# Patient Record
Sex: MALE | Race: WHITE | NOT HISPANIC OR LATINO | Employment: FULL TIME | URBAN - METROPOLITAN AREA
[De-identification: names, ages, dates, MRNs, and addresses within clinical notes are randomized per-mention and may not be internally consistent; named-entity substitution may affect disease eponyms.]

---

## 2022-05-28 ENCOUNTER — NON-PROVIDER VISIT (OUTPATIENT)
Dept: URGENT CARE | Facility: PHYSICIAN GROUP | Age: 22
End: 2022-05-28

## 2022-05-28 DIAGNOSIS — Z02.1 PRE-EMPLOYMENT DRUG SCREENING: ICD-10-CM

## 2022-05-28 LAB
AMP AMPHETAMINE: NEGATIVE
COC COCAINE: NEGATIVE
INT CON NEG: NORMAL
INT CON POS: NORMAL
MET METHAMPHETAMINES: NEGATIVE
OPI OPIATES: NEGATIVE
PCP PHENCYCLIDINE: NEGATIVE
POC DRUG COMMENT 753798-OCCUPATIONAL HEALTH: NEGATIVE
THC: NEGATIVE

## 2022-05-28 PROCEDURE — 80305 DRUG TEST PRSMV DIR OPT OBS: CPT | Performed by: FAMILY MEDICINE

## 2024-10-03 ENCOUNTER — APPOINTMENT (OUTPATIENT)
Dept: URGENT CARE | Facility: CLINIC | Age: 24
End: 2024-10-03

## 2025-02-21 ENCOUNTER — OFFICE VISIT (OUTPATIENT)
Dept: URGENT CARE | Facility: CLINIC | Age: 25
End: 2025-02-21
Payer: COMMERCIAL

## 2025-02-21 VITALS
TEMPERATURE: 98 F | OXYGEN SATURATION: 98 % | HEART RATE: 88 BPM | DIASTOLIC BLOOD PRESSURE: 62 MMHG | BODY MASS INDEX: 25.3 KG/M2 | HEIGHT: 73 IN | WEIGHT: 190.9 LBS | SYSTOLIC BLOOD PRESSURE: 122 MMHG | RESPIRATION RATE: 16 BRPM

## 2025-02-21 DIAGNOSIS — R11.2 NAUSEA VOMITING AND DIARRHEA: ICD-10-CM

## 2025-02-21 DIAGNOSIS — R63.4 UNINTENTIONAL WEIGHT LOSS: ICD-10-CM

## 2025-02-21 DIAGNOSIS — R19.7 NAUSEA VOMITING AND DIARRHEA: ICD-10-CM

## 2025-02-21 PROCEDURE — 3078F DIAST BP <80 MM HG: CPT | Performed by: STUDENT IN AN ORGANIZED HEALTH CARE EDUCATION/TRAINING PROGRAM

## 2025-02-21 PROCEDURE — 3074F SYST BP LT 130 MM HG: CPT | Performed by: STUDENT IN AN ORGANIZED HEALTH CARE EDUCATION/TRAINING PROGRAM

## 2025-02-21 PROCEDURE — 99214 OFFICE O/P EST MOD 30 MIN: CPT | Performed by: STUDENT IN AN ORGANIZED HEALTH CARE EDUCATION/TRAINING PROGRAM

## 2025-02-21 RX ORDER — ONDANSETRON 4 MG/1
4 TABLET, ORALLY DISINTEGRATING ORAL EVERY 6 HOURS PRN
Qty: 15 TABLET | Refills: 0 | Status: SHIPPED | OUTPATIENT
Start: 2025-02-21

## 2025-02-21 ASSESSMENT — ENCOUNTER SYMPTOMS
ABDOMINAL PAIN: 0
CHILLS: 0
NAUSEA: 1
DIARRHEA: 1
BLOOD IN STOOL: 0
FEVER: 0
VOMITING: 1
CONSTIPATION: 0

## 2025-02-21 NOTE — PROGRESS NOTES
"Subjective     Juan Carlos Espino is a 25 y.o. male who presents with Diarrhea (X2days vomiting/abdominal discomfort/cramping/pt needs  Note/weight loss 20lb in the last 4 months )            Juan Carlos is a 25 y.o. male who presents urgent care with nausea, vomiting, diarrhea.  Symptoms started 2 days ago.  Patient missed work today due to symptoms and needs a work note.  Reports intermittent abdominal cramping.  No known risk factors.  No recent travel, no recent antibiotic use/hospitalization.  No known sick contacts or close contacts experiencing similar symptoms.  Patient did note to the [facility] that he has had unintentional weight loss of about 20 pounds over the last 4 months.  Patient states he potentially could be exercising more while at work as there is a big hill that he has to walk up multiple times a day.  Wife states he has been eating less than normal as well.  Prior to nausea, vomiting, diarrhea patient did not have abdominal pain.  He did report some acid reflux which resolved after he stopped drinking soda in the evening time.        Review of Systems   Constitutional:  Positive for malaise/fatigue. Negative for chills and fever.   Gastrointestinal:  Positive for diarrhea, nausea and vomiting. Negative for abdominal pain, blood in stool, constipation and melena.   All other systems reviewed and are negative.             Objective     /62   Pulse 88   Temp 36.7 °C (98 °F) (Temporal)   Resp 16   Ht 1.854 m (6' 1\")   Wt 86.6 kg (190 lb 14.4 oz)   SpO2 98%   BMI 25.19 kg/m²      Physical Exam  Vitals reviewed.   Constitutional:       General: He is not in acute distress.     Appearance: Normal appearance. He is not ill-appearing, toxic-appearing or diaphoretic.   HENT:      Head: Normocephalic and atraumatic.      Nose: Nose normal.   Eyes:      Extraocular Movements: Extraocular movements intact.      Conjunctiva/sclera: Conjunctivae normal.      Pupils: Pupils are equal, round, and reactive " to light.   Cardiovascular:      Rate and Rhythm: Normal rate.   Pulmonary:      Effort: Pulmonary effort is normal.   Abdominal:      General: Abdomen is flat. Bowel sounds are normal. There is no distension.      Palpations: Abdomen is soft.      Tenderness: There is generalized abdominal tenderness. There is no guarding or rebound.   Skin:     General: Skin is warm.   Neurological:      General: No focal deficit present.      Mental Status: He is alert and oriented to person, place, and time.                                  Assessment & Plan  Nausea vomiting and diarrhea  - N/V/D x 2 days.     Orders:    ondansetron (ZOFRAN ODT) 4 MG TABLET DISPERSIBLE; Take 1 Tablet by mouth every 6 hours as needed for Nausea/Vomiting.    Unintentional weight loss  - Unintentional weight loss reported over last 4 months.  Symptoms of N/V/D just started 2 days ago. No previous abdominal pain. He did report acid reflux which resolved after he stopped drinking soda in the evening.  Patient states he might be getting more exercise at work recently.  Patient's wife states he has been eating less than her which previously was not the case. Referral to follow up with PCP.    Orders:    Referral to establish with PCP         Differential diagnoses, supportive care measures (rest, importance of oral hydration, bland/brat diet) and indications for immediate follow-up discussed with patient. Pathogenesis of diagnosis discussed including typical length and natural progression.      Instructed to return to urgent care or nearest emergency department if symptoms fail to improve, for any change in condition, further concerns, or new concerning symptoms.    Patient states understanding and agrees with the plan of care and discharge instructions.             My total time spent caring for the patient on the day of the encounter was 30 minutes including obtain patient history, physical exam, discussing differential diagnosis, plan of care,  supportive care, appropriate follow-up, indications for immediate follow-up. This does not include time spent on separately billable procedures/tests.

## 2025-02-21 NOTE — LETTER
February 21, 2025    To Whom It May Concern:         This is confirmation that Juan Carlos Espino attended his scheduled appointment with Eloina Bailey P.A.-C. on 2/21/25. Please excuse work absences through 2/24/25 for medical reasons. Juan Carlos can return to work without restrictions on 2/25/25 or earlier as long as symptoms have improved/resolved and there has been no fever for 24 hours.          Sincerely,      Eloina Bailey P.A.-C.  [phone]